# Patient Record
Sex: FEMALE
[De-identification: names, ages, dates, MRNs, and addresses within clinical notes are randomized per-mention and may not be internally consistent; named-entity substitution may affect disease eponyms.]

---

## 2022-06-21 ENCOUNTER — HOSPITAL ENCOUNTER (EMERGENCY)
Dept: HOSPITAL 5 - ED | Age: 47
Discharge: HOME | End: 2022-06-21
Payer: MEDICAID

## 2022-06-21 VITALS — DIASTOLIC BLOOD PRESSURE: 89 MMHG | SYSTOLIC BLOOD PRESSURE: 128 MMHG

## 2022-06-21 DIAGNOSIS — L08.9: Primary | ICD-10-CM

## 2022-06-21 DIAGNOSIS — Z90.49: ICD-10-CM

## 2022-06-21 PROCEDURE — 99282 EMERGENCY DEPT VISIT SF MDM: CPT

## 2022-06-21 NOTE — EMERGENCY DEPARTMENT REPORT
- General


Chief complaint: Skin Rash


Stated complaint: LT HAND BRUSE


Time Seen by Provider: 22 13:10


Source: patient


Mode of arrival: Ambulatory


Limitations: No Limitations





- History of Present Illness


Initial comments: 





Patient is a 46-year-old that comes to us from [facility].  She has a rash on her 

left hand.  She states that it started after a fall.  But it looks like it has a

secondary infection so [facility] sent her here for medical clearance.  Differential

diagnosis would be contact dermatitis, with secondary infection








Patient has no systemic symptoms.  She has no fever or chills.  No hypotension 

or tachycardia.  She has full range of motion of the hand.  She is 

neurovascularly and peripheral vascularly intact.


MD complaint: rash


-: Gradual, days(s)


Tetanus Up to Date: yes


Severity scale (0 -10): 0


Improves with: none


Worsens with: none


Context: none


Treatments Prior to Arrival: none





- Related Data


                                  Previous Rx's











 Medication  Instructions  Recorded  Last Taken  Type


 


Neomy/Baci/Polymyx B Opth Oint 1 inch TP BID #1 tube 22 Unknown Rx





[Neosporin]    


 


Sulfamethoxazole/Trimethoprim 1 each PO BID #10 tablet 22 Unknown Rx





[Bactrim DS TAB]    











                                    Allergies











Allergy/AdvReac Type Severity Reaction Status Date / Time


 


No Known Allergies Allergy   Verified 22 13:27














Abscess Boil HPI





- HPI


Chief Complaint: Skin Rash


Stated Complaint: LT HAND BRUSE


Time Seen by Provider: 22 13:10


Home Medications: 


                                  Previous Rx's











 Medication  Instructions  Recorded  Last Taken  Type


 


Neomy/Baci/Polymyx B Opth Oint 1 inch TP BID #1 tube 22 Unknown Rx





[Neosporin]    


 


Sulfamethoxazole/Trimethoprim 1 each PO BID #10 tablet 22 Unknown Rx





[Bactrim DS TAB]    











Allergies/Adverse Reactions: 


                                    Allergies











Allergy/AdvReac Type Severity Reaction Status Date / Time


 


No Known Allergies Allergy   Verified 22 13:27














ED Review of Systems


ROS: 


Stated complaint: LT HAND BRUSE


Other details as noted in HPI





Comment: All other systems reviewed and negative





ED Past Medical Hx





- Past Medical History


Previous Medical History?: Yes


Additional medical history: MRSA, substance abuse





- Surgical History


Past Surgical History?: Yes


Hx Cholecystectomy: Yes


Additional Surgical History: , endometrial tumor removed, cervical 

spine sx





- Family History


Family history: no significant





- Social History


Smoking Status: Never Smoker


Substance Use Type: None





- Medications


Home Medications: 


                                Home Medications











 Medication  Instructions  Recorded  Confirmed  Last Taken  Type


 


Neomy/Baci/Polymyx B Opth Oint 1 inch TP BID #1 tube 22  Unknown Rx





[Neosporin]     


 


Sulfamethoxazole/Trimethoprim 1 each PO BID #10 tablet 22  Unknown Rx





[Bactrim DS TAB]     














ED Physical Exam





- General


Limitations: No Limitations


General appearance: alert, in no apparent distress





- Head


Head exam: Present: atraumatic, normocephalic





- Eye


Eye exam: Present: normal appearance





- ENT


ENT exam: Present: mucous membranes moist





- Neck


Neck exam: Present: normal inspection





- Respiratory


Respiratory exam: Present: normal lung sounds bilaterally.  Absent: respiratory 

distress





- Cardiovascular


Cardiovascular Exam: Present: regular rate, normal rhythm.  Absent: systolic 

murmur, diastolic murmur, rubs, gallop





- GI/Abdominal


GI/Abdominal exam: Present: soft, normal bowel sounds





- Extremities Exam


Extremities exam: Present: normal inspection





- Back Exam


Back exam: Present: normal inspection





- Neurological Exam


Neurological exam: Present: alert, oriented X3





- Psychiatric


Psychiatric exam: Present: normal affect, normal mood





- Skin


Skin exam: Present: warm, dry, intact, normal color.  Absent: rash





ED Course


                                   Vital Signs











  22





  12:35


 


Temperature 97.8 F


 


Pulse Rate 64


 


Respiratory 12





Rate 


 


Blood Pressure 128/89


 


O2 Sat by Pulse 100





Oximetry 














ED Medical Decision Making





- Medical Decision Making








                                   Vital Signs











  22





  12:35


 


Temperature 97.8 F


 


Pulse Rate 64


 


Respiratory 12





Rate 


 


Blood Pressure 128/89


 


O2 Sat by Pulse 100





Oximetry 








Patient has a history of MRSA.








We will treat patient with systemic antibiotics and localized cream.








Vital signs are normal.  She has no hypotension tachycardia.  No fever.  Patient

has no systemic symptoms








Medically cleared for return to [facility].  Discharge home with discharge plan of 

care including diet, activity medications and follow-up.  She verbalizes 

understanding of plan of care














- Differential Diagnosis


Wound to the left dorsal surface of her hand with secondary infection versu


Critical care attestation.: 


If time is entered above; I have spent that time in minutes in the direct care 

of this critically ill patient, excluding procedure time.








ED Disposition


Clinical Impression: 


 Secondary infection of skin





Disposition: 01 HOME / SELF CARE / HOMELESS


Is pt being admited?: No


Does the pt Need Aspirin: No


Condition: Stable


Additional Instructions: 


Referral given to patient for nonmedical emergencies.  Please use primary care.








Bactrim as ordered until gone.  





Wound should be kept clean and dry.  Apply Neosporin twice a day with a gauze 

dressing








Diet and activity as tolerated








Follow-up with PCP next week to make sure she is improving.  Referral below








Patient is not contagious and may return to [facility].


Prescriptions: 


Sulfamethoxazole/Trimethoprim [Bactrim DS TAB] 1 each PO BID #10 tablet


Neomy/Baci/Polymyx B Opth Oint [Neosporin] 1 inch TP BID #1 tube


Referrals: 


PHUONG AYALA MD [Staff Physician] - 3-5 Days


Time of Disposition: 13:17